# Patient Record
Sex: MALE | Race: WHITE | Employment: OTHER | URBAN - METROPOLITAN AREA
[De-identification: names, ages, dates, MRNs, and addresses within clinical notes are randomized per-mention and may not be internally consistent; named-entity substitution may affect disease eponyms.]

---

## 2021-03-26 ENCOUNTER — HOSPITAL ENCOUNTER (OUTPATIENT)
Age: 56
Setting detail: SPECIMEN
Discharge: HOME | End: 2021-03-26

## 2021-03-26 LAB
ALBUMIN SERPL-MCNC: 4.5 GM/DL (ref 3.4–5)
ALP BLD-CCNC: 59 IU/L (ref 40–128)
ALT SERPL-CCNC: 35 U/L (ref 10–40)
ANION GAP SERPL CALCULATED.3IONS-SCNC: 12 MMOL/L (ref 4–16)
AST SERPL-CCNC: 67 IU/L (ref 15–37)
BILIRUB SERPL-MCNC: 0.5 MG/DL (ref 0–1)
BUN BLDV-MCNC: 9 MG/DL (ref 6–23)
CALCIUM SERPL-MCNC: 8.9 MG/DL (ref 8.3–10.6)
CHLORIDE BLD-SCNC: 97 MMOL/L (ref 99–110)
CHOLESTEROL: 147 MG/DL
CO2: 23 MMOL/L (ref 21–32)
CREAT SERPL-MCNC: 0.9 MG/DL (ref 0.9–1.3)
GFR AFRICAN AMERICAN: >60 ML/MIN/1.73M2
GFR NON-AFRICAN AMERICAN: >60 ML/MIN/1.73M2
GLUCOSE BLD-MCNC: 80 MG/DL (ref 70–99)
HDLC SERPL-MCNC: 50 MG/DL
LDL CHOLESTEROL DIRECT: 87 MG/DL
POTASSIUM SERPL-SCNC: 4.8 MMOL/L (ref 3.5–5.1)
SODIUM BLD-SCNC: 132 MMOL/L (ref 135–145)
TOTAL PROTEIN: 6.9 GM/DL (ref 6.4–8.2)
TRIGL SERPL-MCNC: 59 MG/DL

## 2021-03-26 PROCEDURE — 80053 COMPREHEN METABOLIC PANEL: CPT

## 2021-03-26 PROCEDURE — 80074 ACUTE HEPATITIS PANEL: CPT

## 2021-03-26 PROCEDURE — 80061 LIPID PANEL: CPT

## 2021-03-26 PROCEDURE — 36415 COLL VENOUS BLD VENIPUNCTURE: CPT

## 2021-03-26 PROCEDURE — 83721 ASSAY OF BLOOD LIPOPROTEIN: CPT

## 2021-03-27 LAB
HAV IGM SER IA-ACNC: NON REACTIVE
HEPATITIS B CORE IGM ANTIBODY: NON REACTIVE
HEPATITIS B SURFACE ANTIGEN: NON REACTIVE
HEPATITIS C ANTIBODY: NON REACTIVE

## 2022-10-14 ENCOUNTER — HOSPITAL ENCOUNTER (EMERGENCY)
Age: 57
Discharge: PSYCHIATRIC OR SUBSTANCE USE FACILITY | End: 2022-10-15
Attending: STUDENT IN AN ORGANIZED HEALTH CARE EDUCATION/TRAINING PROGRAM
Payer: MEDICARE

## 2022-10-14 DIAGNOSIS — R44.1 VISUAL HALLUCINATIONS: ICD-10-CM

## 2022-10-14 DIAGNOSIS — F41.1 GENERALIZED ANXIETY DISORDER: Primary | ICD-10-CM

## 2022-10-14 LAB
ACETAMINOPHEN LEVEL: <5 UG/ML (ref 15–30)
ALCOHOL SCREEN SERUM: <0.01 %WT/VOL
AMPHETAMINES: NEGATIVE
ANION GAP SERPL CALCULATED.3IONS-SCNC: 9 MMOL/L (ref 4–16)
BARBITURATE SCREEN URINE: NEGATIVE
BASOPHILS ABSOLUTE: 0.1 K/CU MM
BASOPHILS RELATIVE PERCENT: 1.3 % (ref 0–1)
BENZODIAZEPINE SCREEN, URINE: NEGATIVE
BUN BLDV-MCNC: 11 MG/DL (ref 6–23)
CALCIUM SERPL-MCNC: 9.4 MG/DL (ref 8.3–10.6)
CANNABINOID SCREEN URINE: NEGATIVE
CHLORIDE BLD-SCNC: 94 MMOL/L (ref 99–110)
CO2: 28 MMOL/L (ref 21–32)
COCAINE METABOLITE: NEGATIVE
CREAT SERPL-MCNC: 1 MG/DL (ref 0.9–1.3)
DIFFERENTIAL TYPE: ABNORMAL
DOSE AMOUNT: ABNORMAL
DOSE AMOUNT: ABNORMAL
DOSE TIME: ABNORMAL
DOSE TIME: ABNORMAL
EOSINOPHILS ABSOLUTE: 0.1 K/CU MM
EOSINOPHILS RELATIVE PERCENT: 1.5 % (ref 0–3)
GFR AFRICAN AMERICAN: >60 ML/MIN/1.73M2
GFR NON-AFRICAN AMERICAN: >60 ML/MIN/1.73M2
GLUCOSE BLD-MCNC: 105 MG/DL (ref 70–99)
GLUCOSE BLD-MCNC: 126 MG/DL (ref 70–99)
HCT VFR BLD CALC: 40.5 % (ref 42–52)
HEMOGLOBIN: 13.8 GM/DL (ref 13.5–18)
IMMATURE NEUTROPHIL %: 0.2 % (ref 0–0.43)
LYMPHOCYTES ABSOLUTE: 1.2 K/CU MM
LYMPHOCYTES RELATIVE PERCENT: 22.9 % (ref 24–44)
MCH RBC QN AUTO: 30.3 PG (ref 27–31)
MCHC RBC AUTO-ENTMCNC: 34.1 % (ref 32–36)
MCV RBC AUTO: 88.8 FL (ref 78–100)
MONOCYTES ABSOLUTE: 0.5 K/CU MM
MONOCYTES RELATIVE PERCENT: 9 % (ref 0–4)
NUCLEATED RBC %: 0 %
OPIATES, URINE: NEGATIVE
OXYCODONE: NEGATIVE
PDW BLD-RTO: 12.6 % (ref 11.7–14.9)
PHENCYCLIDINE, URINE: NEGATIVE
PLATELET # BLD: 353 K/CU MM (ref 140–440)
PMV BLD AUTO: 8.9 FL (ref 7.5–11.1)
POTASSIUM SERPL-SCNC: 3.6 MMOL/L (ref 3.5–5.1)
RBC # BLD: 4.56 M/CU MM (ref 4.6–6.2)
SALICYLATE LEVEL: <0.3 MG/DL (ref 15–30)
SARS-COV-2, NAAT: NOT DETECTED
SEGMENTED NEUTROPHILS ABSOLUTE COUNT: 3.5 K/CU MM
SEGMENTED NEUTROPHILS RELATIVE PERCENT: 65.1 % (ref 36–66)
SODIUM BLD-SCNC: 131 MMOL/L (ref 135–145)
SOURCE: NORMAL
TOTAL IMMATURE NEUTOROPHIL: 0.01 K/CU MM
TOTAL NUCLEATED RBC: 0 K/CU MM
WBC # BLD: 5.4 K/CU MM (ref 4–10.5)

## 2022-10-14 PROCEDURE — 80048 BASIC METABOLIC PNL TOTAL CA: CPT

## 2022-10-14 PROCEDURE — 6370000000 HC RX 637 (ALT 250 FOR IP): Performed by: STUDENT IN AN ORGANIZED HEALTH CARE EDUCATION/TRAINING PROGRAM

## 2022-10-14 PROCEDURE — G0480 DRUG TEST DEF 1-7 CLASSES: HCPCS

## 2022-10-14 PROCEDURE — 87635 SARS-COV-2 COVID-19 AMP PRB: CPT

## 2022-10-14 PROCEDURE — 82962 GLUCOSE BLOOD TEST: CPT

## 2022-10-14 PROCEDURE — 99284 EMERGENCY DEPT VISIT MOD MDM: CPT | Performed by: PSYCHIATRY & NEUROLOGY

## 2022-10-14 PROCEDURE — 85025 COMPLETE CBC W/AUTO DIFF WBC: CPT

## 2022-10-14 PROCEDURE — 99285 EMERGENCY DEPT VISIT HI MDM: CPT

## 2022-10-14 PROCEDURE — 80307 DRUG TEST PRSMV CHEM ANLYZR: CPT

## 2022-10-14 RX ORDER — 0.9 % SODIUM CHLORIDE 0.9 %
1000 INTRAVENOUS SOLUTION INTRAVENOUS ONCE
Status: DISCONTINUED | OUTPATIENT
Start: 2022-10-14 | End: 2022-10-15 | Stop reason: HOSPADM

## 2022-10-14 RX ORDER — LORAZEPAM 1 MG/1
1 TABLET ORAL ONCE
Status: COMPLETED | OUTPATIENT
Start: 2022-10-14 | End: 2022-10-14

## 2022-10-14 RX ADMIN — LORAZEPAM 1 MG: 1 TABLET ORAL at 17:18

## 2022-10-14 ASSESSMENT — PAIN DESCRIPTION - LOCATION
LOCATION: HEAD
LOCATION: HEAD

## 2022-10-14 ASSESSMENT — PAIN SCALES - GENERAL
PAINLEVEL_OUTOF10: 6
PAINLEVEL_OUTOF10: 6

## 2022-10-14 ASSESSMENT — LIFESTYLE VARIABLES
HOW OFTEN DO YOU HAVE A DRINK CONTAINING ALCOHOL: NEVER
HOW MANY STANDARD DRINKS CONTAINING ALCOHOL DO YOU HAVE ON A TYPICAL DAY: PATIENT DOES NOT DRINK

## 2022-10-14 ASSESSMENT — PAIN DESCRIPTION - PAIN TYPE: TYPE: ACUTE PAIN

## 2022-10-14 ASSESSMENT — PAIN - FUNCTIONAL ASSESSMENT: PAIN_FUNCTIONAL_ASSESSMENT: 0-10

## 2022-10-14 NOTE — CARE COORDINATION
CM paged Dr. Dunia Luque 19:49. Dr. Dunia Luque saw patient on Ipad and recommended for patient the following: \"Pt requires inpt psychiatric admission once medically cleared, pt reqires sitter until transfer. \"    23:00 CM made referral to [facility]. [facility] seeking placement. Patient prefers to now go to [facility] if possible. 12:00 [facility] called requesting an updated BP as patients' BP to high. [facility] refused patient due to BP.    05:30 [facility] still seeking placement.

## 2022-10-14 NOTE — ED NOTES
ABCs intact; CORREA x 4. Pt presents to ER for anxiety, insomnia, and various medical complaints. Pt has hx of anxiety and panic disorder. Pt reports insomnia x 3 weeks. Pt also reports chronic HTN that he takes clonidine and lisinopril for, however he feels that this is not working. Pt is notably hypertensive on arrival. Pt reports numbness in extremities bilaterally, a headache, flank area pain, and dark urine. Pt was unable to urinate. Pt was changed into green gown and belongings were collected and inventoried by this Paramedic. 1:1  was initiated by this Paramedic as well. Pt stated that he had a visit with his therapist in [facility] who recommended he come to a hospital for his medical complaints and to a psychiatric hospital for the prolonged insomnia. Pt initially denied this and decided today to seek treatment for all of the above listed problems. Pt denies SI or HI. Pt states he would prefer to not be admitted to [facility] as he has had a negative experience there previously but understands if that is the only available location. Pt is resting comfortably in room and denies any needs at this time. Pt aware of urine sample need and will be provided water. Primary RN Daniel Ovalle and attending Dr. Jolynn Sosa have assessed the pt as of this note.     Lauren Herring, DIOGENES Pemberton  10/14/22 6960

## 2022-10-14 NOTE — ED NOTES
Monie Mini - [phone]    Pt's father; pt gives permission to release any information to Mr. Royal Galvan over the phone/in-person. Blood and COVID swab have been sent to lab. Awaiting urine, pt aware.     DIOGENES Weir Eb  10/14/22 4449

## 2022-10-14 NOTE — ED NOTES
Per Dr. Kate Chisholm, pt does not require a  due to no SI/HI concerns. Pt is to remain in green gown however.      Germania Sewell, NRJUVENTINO Cuello  10/14/22 3547

## 2022-10-14 NOTE — ED PROVIDER NOTES
Emergency Department Encounter    Patient: Jeny Landeros  MRN: [id number]  : 1965  Date of Evaluation: 10/15/2022  ED Provider:  [address] [place] [address],[address], DO    Triage Chief Complaint:   Mental Health Problem (Insomnia; Pt's psychiatrist from [facility] wants pt admitted to [facility] because he hasn't slept for 3 weeks), Hypertension (Takes clonidine and lisinopril; states its not working; him and psychiatrist are concerned he will have a stroke), Numbness (In hands; x 4 days), Headache (X 4 days), and Dysphagia (X 4 days)    [place]:  Jeny Landeros is a 59 y.o. male with a past medical history of anxiety and insomnia that presents to the ED with a history of worsening anxiety. Patient states he been unable to sleep for the past couple of weeks. Patient states his anxiety is rising high and also admits to some hallucinations. Patient states sometimes he sees flashes of lights. Patient denies any suicidal homicidal ideation. Patient also denies auditory hallucinations or tactile hallucinations. No history of chest pain, shortness of breath, fever, dizziness/lightheadedness, or focal neurologic deficits.     ROS - see HPI, below listed is current ROS at time of my eval:  General:  No fevers, no chills, no weakness  Eyes:  No recent vison changes, no discharge  ENT:  No sore throat, no nasal congestion, no hearing changes  Cardiovascular:  No chest pain, no palpitations  Respiratory:  No shortness of breath, no cough, no wheezing  Gastrointestinal:  No pain, no nausea, no vomiting, no diarrhea  Musculoskeletal:  No muscle pain, no joint pain  Skin:  No rash, no pruritis, no easy bruising  Neurologic:  No speech problems, no headache, no extremity numbness, no extremity tingling, no extremity weakness  Psychiatric:  + anxiety  Genitourinary:  No dysuria, no hematuria  Endocrine:  No unexpected weight gain, no unexpected weight loss  Extremities:  no edema, no pain    Past Medical History:   Diagnosis Date AVM (arteriovenous malformation)     R side of face    Depression     Hypertension     OCD (obsessive compulsive disorder)     Panic disorder      History reviewed. No pertinent surgical history. History reviewed. No pertinent family history.   Social History     Socioeconomic History    Marital status: Unknown     Spouse name: Not on file    Number of children: Not on file    Years of education: Not on file    Highest education level: Not on file   Occupational History    Not on file   Tobacco Use    Smoking status: Never    Smokeless tobacco: Never   Substance and Sexual Activity    Alcohol use: Not Currently    Drug use: Not on file    Sexual activity: Not on file   Other Topics Concern    Not on file   Social History Narrative    Not on file     Social Determinants of Health     Financial Resource Strain: Not on file   Food Insecurity: Not on file   Transportation Needs: Not on file   Physical Activity: Not on file   Stress: Not on file   Social Connections: Not on file   Intimate Partner Violence: Not on file   Housing Stability: Not on file     Current Facility-Administered Medications   Medication Dose Route Frequency Provider Last Rate Last Admin    cloNIDine (CATAPRES) tablet 0.1 mg  0.1 mg Oral BID Lowell Herrera DO   0.1 mg at 10/15/22 0082    lisinopril (PRINIVIL;ZESTRIL) tablet 10 mg  10 mg Oral Once Evelia Lizarraga MD        ARIPiprazole (ABILIFY) tablet 15 mg  15 mg Oral Daily Evelia Lizarraga MD        busPIRone (BUSPAR) tablet 30 mg  30 mg Oral Once Evelia Lizarraga MD        traZODone (DESYREL) tablet 200 mg  200 mg Oral Nightly Evelia Lizarraga MD        clomiPRAMINE (ANAFRANIL) capsule 150 mg  150 mg Oral Nightly Evelia Lizarraga MD        0.9 % sodium chloride bolus  1,000 mL IntraVENous Once Heike Batista DO   Held at 10/14/22 9939     Current Outpatient Medications   Medication Sig Dispense Refill    cloNIDine (CATAPRES) 0.1 MG tablet Take 0.1 mg by mouth 2 times daily      lisinopril (PRINIVIL;ZESTRIL) 10 MG tablet Take 10 mg by mouth daily       No Known Allergies    Nursing Notes Reviewed    Physical Exam:  Triage VS:    ED Triage Vitals [10/14/22 1610]   Enc Vitals Group      BP (!) 182/105      Heart Rate (!) 102      Resp 20      Temp 98.4 °F (36.9 °C)      Temp Source Oral      SpO2 100 %      Weight 175 lb (79.4 kg)      Height 5' 11\" (1.803 m)      Head Circumference       Peak Flow       Pain Score       Pain Loc       Pain Edu? Excl. in [address]? My pulse ox interpretation is - normal    General appearance: Anxious looking. No acute distress. Skin:  Warm. Dry. Eye:  Extraocular movements intact. Ears, nose, mouth and throat:  Oral mucosa moist   Neck:  Trachea midline. Extremity:  No swelling. Normal ROM     Heart:  Regular rate and rhythm, normal S1 & S2, no extra heart sounds. Perfusion:  intact  Respiratory:  Lungs clear to auscultation bilaterally. Respirations nonlabored. Abdominal:  Normal bowel sounds. Soft. Nontender. Non distended. Back:  No CVA tenderness to palpation     Neurological:  Alert and oriented times 3. No focal neuro deficits.              Psychiatric:  Appropriate    I have reviewed and interpreted all of the currently available lab results from this visit (if applicable):  Results for orders placed or performed during the hospital encounter of 10/14/22   COVID-19, Rapid    Specimen: Nasopharyngeal   Result Value Ref Range    Source UNKNOWN     SARS-CoV-2, NAAT NOT DETECTED NOT DETECTED   BMP   Result Value Ref Range    Sodium 131 (L) 135 - 145 MMOL/L    Potassium 3.6 3.5 - 5.1 MMOL/L    Chloride 94 (L) 99 - 110 mMol/L    CO2 28 21 - 32 MMOL/L    Anion Gap 9 4 - 16    BUN 11 6 - 23 MG/DL    Creatinine 1.0 0.9 - 1.3 MG/DL    Glucose 105 (H) 70 - 99 MG/DL    Calcium 9.4 8.3 - 10.6 MG/DL    GFR Non-African American >60 >60 mL/min/1.73m2    GFR African American >60 >60 mL/min/1.73m2   CBC with Auto Differential   Result Value Ref Range WBC 5.4 4.0 - 10.5 K/CU MM    RBC 4.56 (L) 4.6 - 6.2 M/CU MM    Hemoglobin 13.8 13.5 - 18.0 GM/DL    Hematocrit 40.5 (L) 42 - 52 %    MCV 88.8 78 - 100 FL    MCH 30.3 27 - 31 PG    MCHC 34.1 32.0 - 36.0 %    RDW 12.6 11.7 - 14.9 %    Platelets 514 209 - 153 K/CU MM    MPV 8.9 7.5 - 11.1 FL    Differential Type AUTOMATED DIFFERENTIAL     Segs Relative 65.1 36 - 66 %    Lymphocytes % 22.9 (L) 24 - 44 %    Monocytes % 9.0 (H) 0 - 4 %    Eosinophils % 1.5 0 - 3 %    Basophils % 1.3 (H) 0 - 1 %    Segs Absolute 3.5 K/CU MM    Lymphocytes Absolute 1.2 K/CU MM    Monocytes Absolute 0.5 K/CU MM    Eosinophils Absolute 0.1 K/CU MM    Basophils Absolute 0.1 K/CU MM    Nucleated RBC % 0.0 %    Total Nucleated RBC 0.0 K/CU MM    Total Immature Neutrophil 0.01 K/CU MM    Immature Neutrophil % 0.2 0 - 0.43 %   Drug screen multi urine   Result Value Ref Range    Cannabinoid Scrn, Ur NEGATIVE NEGATIVE    Amphetamines NEGATIVE NEGATIVE    Cocaine Metabolite NEGATIVE NEGATIVE    Benzodiazepine Screen, Urine NEGATIVE NEGATIVE    Barbiturate Screen, Ur NEGATIVE NEGATIVE    Opiates, Urine NEGATIVE NEGATIVE    Phencyclidine, Urine NEGATIVE NEGATIVE    Oxycodone NEGATIVE NEGATIVE   Ethanol   Result Value Ref Range    Alcohol Scrn <0.01 <1.98 %WT/VOL   Salicylate   Result Value Ref Range    Salicylate Lvl <5.6 (L) 15 - 30 MG/DL    DOSE AMOUNT DOSE AMT. GIVEN - UNKNOWN     DOSE TIME DOSE TIME GIVEN - UNKNOWN    Acetaminophen Level   Result Value Ref Range    Acetaminophen Level <5.0 (L) 15 - 30 ug/ml    DOSE AMOUNT DOSE AMT. GIVEN - UNKNOWN     DOSE TIME DOSE TIME GIVEN - UNKNOWN    POCT Glucose   Result Value Ref Range    POC Glucose 126 (H) 70 - 99 MG/DL      Radiographs (if obtained):  Radiologist's Report Reviewed:  No results found.     EKG (if obtained): (All EKG's are interpreted by myself in the absence of a cardiologist)      MDM:    75-year-old male with a past medical history of anxiety, insomnia and history of panic attacks presented to the ED with worsening anxiety. Patient also endorses hallucinations saying that he has been seeing flashes of light. Physical examination is unremarkable. Patient pink slipped and evaluated for psych. Patient's laboratory valuation is unremarkable. Patient is medically cleared for evaluation by behavioral health. Patient seen by psychiatry and recommendation is for inpatient psychiatric management. Patient is awaiting placement at this time. 11.20PM  Patient care endorsed to Dr Driscoll Aid:  1. Generalized anxiety disorder    2. Visual hallucinations      Disposition referral (if applicable):  No follow-up provider specified. Disposition medications (if applicable):  New Prescriptions    No medications on file     ED Provider Disposition Time  DISPOSITION        Comment: Please note this report has been produced using speech recognition software and may contain errors related to that system including errors in grammar, punctuation, and spelling, as well as words and phrases that may be inappropriate. Efforts were made to edit the dictations.         [facility],[address], DO  10/15/22 1856

## 2022-10-15 VITALS
RESPIRATION RATE: 18 BRPM | HEIGHT: 71 IN | DIASTOLIC BLOOD PRESSURE: 87 MMHG | BODY MASS INDEX: 24.5 KG/M2 | TEMPERATURE: 98.2 F | OXYGEN SATURATION: 98 % | SYSTOLIC BLOOD PRESSURE: 145 MMHG | WEIGHT: 175 LBS | HEART RATE: 81 BPM

## 2022-10-15 PROCEDURE — 6370000000 HC RX 637 (ALT 250 FOR IP): Performed by: EMERGENCY MEDICINE

## 2022-10-15 RX ORDER — LISINOPRIL 5 MG/1
10 TABLET ORAL ONCE
Status: COMPLETED | OUTPATIENT
Start: 2022-10-15 | End: 2022-10-15

## 2022-10-15 RX ORDER — BUSPIRONE HYDROCHLORIDE 15 MG/1
30 TABLET ORAL ONCE
Status: COMPLETED | OUTPATIENT
Start: 2022-10-15 | End: 2022-10-15

## 2022-10-15 RX ORDER — CLONIDINE HYDROCHLORIDE 0.1 MG/1
0.1 TABLET ORAL 2 TIMES DAILY
COMMUNITY

## 2022-10-15 RX ORDER — CLOMIPRAMINE HYDROCHLORIDE 25 MG/1
150 CAPSULE ORAL NIGHTLY
Status: DISCONTINUED | OUTPATIENT
Start: 2022-10-15 | End: 2022-10-15 | Stop reason: HOSPADM

## 2022-10-15 RX ORDER — TRAZODONE HYDROCHLORIDE 50 MG/1
200 TABLET ORAL NIGHTLY
Status: DISCONTINUED | OUTPATIENT
Start: 2022-10-15 | End: 2022-10-15 | Stop reason: HOSPADM

## 2022-10-15 RX ORDER — LISINOPRIL 10 MG/1
10 TABLET ORAL DAILY
COMMUNITY

## 2022-10-15 RX ORDER — CLONIDINE HYDROCHLORIDE 0.1 MG/1
0.1 TABLET ORAL 2 TIMES DAILY
Status: DISCONTINUED | OUTPATIENT
Start: 2022-10-15 | End: 2022-10-15 | Stop reason: HOSPADM

## 2022-10-15 RX ADMIN — ARIPIPRAZOLE 15 MG: 10 TABLET ORAL at 13:06

## 2022-10-15 RX ADMIN — LISINOPRIL 10 MG: 5 TABLET ORAL at 11:35

## 2022-10-15 RX ADMIN — BUSPIRONE HYDROCHLORIDE 30 MG: 15 TABLET ORAL at 11:35

## 2022-10-15 RX ADMIN — CLONIDINE HYDROCHLORIDE 0.1 MG: 0.1 TABLET ORAL at 04:58

## 2022-10-15 ASSESSMENT — PAIN - FUNCTIONAL ASSESSMENT: PAIN_FUNCTIONAL_ASSESSMENT: NONE - DENIES PAIN

## 2022-10-15 NOTE — ED NOTES
Report given to Isha Jaime RN at [facility]. Chart reviewed and all questions answered.       Jay Davis RN  10/15/22 4521

## 2022-10-15 NOTE — ED NOTES
[phone] called [address] [place] [address] Service for BLS transportation to [facility] 4th floor. Spoke with Anmol at dispatch. ETA for  9500.       Beulah Cunningham  10/15/22 6744

## 2022-10-15 NOTE — ED PROVIDER NOTES
Signed out by Dr. Radha Salgado,    43-year-old male presenting with complaint insomnia, visual hallucinations, sent by his psychiatrist, was pink slipped, evaluated by psychiatry Dr. Mahogany Bah and plan for psych placement. Awaiting placement at this time         David Alegria MD  10/15/22 8478      Pending placement, signed out to Dr. Eren Coates. I have ordered all his home meds. His BP is much better controlled.         David Alegria MD  10/15/22 5405

## 2022-10-15 NOTE — ED NOTES
Patient family at bedside to visit and this RN provided patient update. Family inquiring about medications for sleep and the patients daily medications. Medication reconciliation completed with patient and ordered by Dr. Shanda Villar. Patient resting comfortably in bed at this time and denies needs.        Catherine Gonzalezr, RN  10/15/22 4637

## 2022-10-15 NOTE — ED PROVIDER NOTES
CARE RECEIVED FROM: Dr. Kate Chisholm  I reviewed the key elements of the history, physical exam and initial treatment plan at the bedside. ANCILLARY DATA:  I reviewed the images. Radiologist interpretation:   No orders to display     Labs Reviewed   BASIC METABOLIC PANEL - Abnormal; Notable for the following components:       Result Value    Sodium 131 (*)     Chloride 94 (*)     Glucose 105 (*)     All other components within normal limits   CBC WITH AUTO DIFFERENTIAL - Abnormal; Notable for the following components:    RBC 4.56 (*)     Hematocrit 40.5 (*)     Lymphocytes % 22.9 (*)     Monocytes % 9.0 (*)     Basophils % 1.3 (*)     All other components within normal limits   SALICYLATE LEVEL - Abnormal; Notable for the following components:    Salicylate Lvl <3.2 (*)     All other components within normal limits   ACETAMINOPHEN LEVEL - Abnormal; Notable for the following components:    Acetaminophen Level <5.0 (*)     All other components within normal limits   POCT GLUCOSE - Abnormal; Notable for the following components:    POC Glucose 126 (*)     All other components within normal limits   COVID-19, RAPID   URINE DRUG SCREEN   ETHANOL     MEDICAL DECISION MAKING / PLAN:    This is a 76-year-old male that presented to the emergency department with worsening anxiety, insomnia, and visual hallucination. He was pink slipped here. His medical screening evaluation was reassuring. Patient was evaluated by Dr. Dorita Perla of psychiatry with recommendations for inpatient psychiatric hospitalization. At time of signout patient is awaiting psychiatric placement. Continues to await psych placement. Signed out to daytime physician. FINAL IMPRESSION:  1. Generalized anxiety disorder      ? Electronically signed by:  Kuldeep Nascimento DO, 10/15/2022        Kuldeep Nascimento DO  10/15/22 0707

## 2022-10-15 NOTE — ACP (ADVANCE CARE PLANNING)
Patient does not have any ACP documents/Medical Power of . LSW notes hospital will follow [place]'s Next of Kin hierarchy in the following descending order for priority:    Guardian  Spouse  [de-identified] of adult Children  Parents  [de-identified] of adult Siblings  Nearest Relative not described above    Per [place] Next of Kin hierarchy: Patients' parent will be [address].

## 2022-10-15 NOTE — ED NOTES
Spoke with Dr. Francie Eric regarding the needs of a sitter at shift change; per Dr. Francie Eric the patient did not need a sitter and was not pink slipped.       Belén Cuellar RN  10/15/22 1297
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
## 2022-10-15 NOTE — ED PROVIDER NOTES
Paulino Zayas was checked out to me by Dr. Pricila Constantino. Please see his/her initial documentation for details of the patient's ED presentation, physical exam and completed studies. In brief, Paulino Zayas is a 59 y.o. male that presents with insomnia x3 weeks.   Patient was sent in by psychiatrist.    Otf Javier  Results for orders placed or performed during the hospital encounter of 10/14/22   COVID-19, Rapid    Specimen: Nasopharyngeal   Result Value Ref Range    Source UNKNOWN     SARS-CoV-2, NAAT NOT DETECTED NOT DETECTED   BMP   Result Value Ref Range    Sodium 131 (L) 135 - 145 MMOL/L    Potassium 3.6 3.5 - 5.1 MMOL/L    Chloride 94 (L) 99 - 110 mMol/L    CO2 28 21 - 32 MMOL/L    Anion Gap 9 4 - 16    BUN 11 6 - 23 MG/DL    Creatinine 1.0 0.9 - 1.3 MG/DL    Glucose 105 (H) 70 - 99 MG/DL    Calcium 9.4 8.3 - 10.6 MG/DL    GFR Non-African American >60 >60 mL/min/1.73m2    GFR African American >60 >60 mL/min/1.73m2   CBC with Auto Differential   Result Value Ref Range    WBC 5.4 4.0 - 10.5 K/CU MM    RBC 4.56 (L) 4.6 - 6.2 M/CU MM    Hemoglobin 13.8 13.5 - 18.0 GM/DL    Hematocrit 40.5 (L) 42 - 52 %    MCV 88.8 78 - 100 FL    MCH 30.3 27 - 31 PG    MCHC 34.1 32.0 - 36.0 %    RDW 12.6 11.7 - 14.9 %    Platelets 205 422 - 445 K/CU MM    MPV 8.9 7.5 - 11.1 FL    Differential Type AUTOMATED DIFFERENTIAL     Segs Relative 65.1 36 - 66 %    Lymphocytes % 22.9 (L) 24 - 44 %    Monocytes % 9.0 (H) 0 - 4 %    Eosinophils % 1.5 0 - 3 %    Basophils % 1.3 (H) 0 - 1 %    Segs Absolute 3.5 K/CU MM    Lymphocytes Absolute 1.2 K/CU MM    Monocytes Absolute 0.5 K/CU MM    Eosinophils Absolute 0.1 K/CU MM    Basophils Absolute 0.1 K/CU MM    Nucleated RBC % 0.0 %    Total Nucleated RBC 0.0 K/CU MM    Total Immature Neutrophil 0.01 K/CU MM    Immature Neutrophil % 0.2 0 - 0.43 %   Drug screen multi urine   Result Value Ref Range    Cannabinoid Scrn, Ur NEGATIVE NEGATIVE    Amphetamines NEGATIVE NEGATIVE    Cocaine Metabolite NEGATIVE

## 2022-10-15 NOTE — ED NOTES
[facility] here to transport patient to [address] [place] [address] provided with paperwork and all patient belongings. Father (Ed) called and updated.       Augustus Vizcarra RN  10/15/22 [id number]